# Patient Record
Sex: FEMALE | Race: WHITE
[De-identification: names, ages, dates, MRNs, and addresses within clinical notes are randomized per-mention and may not be internally consistent; named-entity substitution may affect disease eponyms.]

---

## 2020-09-22 ENCOUNTER — HOSPITAL ENCOUNTER (INPATIENT)
Dept: HOSPITAL 41 - JD.OB | Age: 25
LOS: 3 days | Discharge: HOME | DRG: 540 | End: 2020-09-25
Attending: OBSTETRICS & GYNECOLOGY | Admitting: OBSTETRICS & GYNECOLOGY
Payer: COMMERCIAL

## 2020-09-22 DIAGNOSIS — Z3A.40: ICD-10-CM

## 2020-09-22 DIAGNOSIS — Z20.828: ICD-10-CM

## 2020-09-22 PROCEDURE — U0002 COVID-19 LAB TEST NON-CDC: HCPCS

## 2020-09-22 RX ADMIN — Medication SCH MLS/HR: at 21:50

## 2020-09-22 NOTE — PCM.LDHP
<Beena Martin - Last Filed: 20 08:27>





L&D History of Present Illness





- General


Admit Problem/Dx: 


                   Patient Status Order with Admit Dx/Problem





20 17:49


Patient Status [ADT] Routine 








                           Admission Diagnosis/Problem











Admission Diagnosis/Problem    Pregnancy

















- Related Data


Allergies/Adverse Reactions: 


                                    Allergies











Allergy/AdvReac Type Severity Reaction Status Date / Time


 


No Known Allergies Allergy   Verified 20 18:42











Home Medications: 


                                    Home Meds





Calcium Carbonate [Tums] 1,000 mg PO BID 20 [History]


Prenatal Vit/FA/Fe Fumarate/Se [Prenatal MTR] 1 tab PO DAILY 20 [History]











H&P Review of Systems





- Review of Systems:


General: Reports: No Symptoms


HEENT: Reports: No Symptoms


Pulmonary: Reports: No Symptoms


Cardiovascular: Reports: No Symptoms


Gastrointestinal: Reports: No Symptoms


Genitourinary: Reports: No Symptoms


Musculoskeletal: Reports: No Symptoms


Skin: Reports: No Symptoms


Psychiatric: Reports: No Symptoms


Neurological: Reports: No Symptoms


Hematologic/Lymphatic: Reports: No Symptoms


Immunologic: Reports: No Symptoms





L&D Exam





- Vital Signs


Vital Signs: 


                                Last Vital Signs











Temp  37.2 C   20 17:49


 


Pulse  86   20 17:49


 


Resp  14   20 17:49


 


BP  131/85   20 17:49


 


Pulse Ox      














- Patient Data


Lab Results Last 24 hrs: 


                         Laboratory Results - last 24 hr











  20 Range/Units





  17:45 18:02 18:02 


 


WBC    9.33  (3.98-10.04)  K/mm3


 


RBC    4.44  (3.98-5.22)  M/mm3


 


Hgb    12.8  (11.2-15.7)  gm/dl


 


Hct    38.4  (34.1-44.9)  %


 


MCV    86.5  (79.4-94.8)  fl


 


MCH    28.8  (25.6-32.2)  pg


 


MCHC    33.3  (32.2-35.5)  g/dl


 


RDW Std Deviation    40.7  (36.4-46.3)  fL


 


Plt Count    240  (182-369)  K/mm3


 


MPV    10.1  (9.4-12.3)  fl


 


Neut % (Auto)    66.0  (34.0-71.1)  %


 


Lymph % (Auto)    22.2  (19.3-51.7)  %


 


Mono % (Auto)    10.4  (4.7-12.5)  %


 


Eos % (Auto)    1.0  (0.7-5.8)  


 


Baso % (Auto)    0.1  (0.1-1.2)  %


 


Neut # (Auto)    6.16 H  (1.56-6.13)  K/mm3


 


Lymph # (Auto)    2.07  (1.18-3.74)  K/mm3


 


Mono # (Auto)    0.97 H  (0.24-0.36)  K/mm3


 


Eos # (Auto)    0.09  (0.04-0.36)  K/mm3


 


Baso # (Auto)    0.01  (0.01-0.08)  K/mm3


 


RPR   Non-reactive   (NONREACTIVE)  


 


SARS CoV-2 RNA Rapid JUANA  Negative    (NEGATIVE)  











Result Diagrams: 


                                 20 18:02





Orders Last 24hrs: 


                               Active Orders 24 hr











 Category Date Time Status


 


 Patient Status [ADT] Routine ADT  20 17:49 Active


 


 Activity as Tolerated [RC] PFP Care  20 17:49 Active


 


 Communication Order [RC] ASDIRECTED Care  20 17:49 Active


 


 Fetal Heart Tones [RC] ASDIRECTED Care  20 17:50 Active


 


 Notify Provider [RC] ASDIRECTED Care  20 01:08 Active


 


 Notify Provider [RC] PFP Care  20 17:49 Active


 


 Notify Provider [RC] PRN Care  20 17:49 Active


 


 Peripheral IV Care [RC] .AS DIRECTED Care  20 17:50 Active


 


 Urinary Catheter Assessment [RC] ASDIRECTED Care  20 17:49 Active


 


 Vital Signs [RC] PER UNIT ROUTINE Care  20 17:49 Active


 


 Acetaminophen [TylenoL] Med  20 17:49 Active





 650 mg PO Q4H PRN   


 


 Bupivacaine/fentaNYL/NS [fentaNYL/Bupivacaine/NS 2 MCG- Med  20 01:08 

Active





 0.125% 100 ML]   





 100 ml EPIDUR ASDIRECTED PRN   


 


 Calcium Carbonate [Tums] Med  20 17:49 Active





 1,000 mg PO Q2H PRN   


 


 Lactated Ringers [Ringers, Lactated] 1,000 ml Med  20 18:00 Active





 IV ASDIRECTED   


 


 Nalbuphine [Nubain] Med  20 17:49 Active





 10 mg IVPUSH Q2H PRN   


 


 Ondansetron [Zofran] Med  20 17:49 Active





 4 mg IVPUSH Q4H PRN   


 


 Oxytocin/Lactated Ringers [Pitocin in LR 10 Units/1,000 Med  20 18:00 

Active





 ML]   





 10 unit in 1,000 ml IV .CONTINUOUS   


 


 Oxytocin/Lactated Ringers [Pitocin in LR 10 Units/1,000 Med  20 18:00 

Active





 ML]   





 10 unit in 1,000 ml IV TITRATE   


 


 Sodium Chloride 0.9% [Saline Flush] Med  20 17:49 Active





 10 ml FLUSH ASDIRECTED PRN   


 


 diphenhydrAMINE [Benadryl] Med  20 01:08 Active





 25 mg IVPUSH Q6H PRN   


 


 ePHEDrine [ePHEDrine sulfate] Med  20 01:08 Active





 5 mg IVPUSH ASDIRECTED PRN   


 


 fentaNYL [Sublimaze] Med  20 01:08 Active





 100 mcg EPIDUR Q3H PRN   


 


 Electronic Fetal Heart Tones Ext w TOCO [WOMSER] Oth  20 17:49 Ordered





 Routine   


 


 Electronic Fetal Heart Tones Internal [WOMSER] Per Unit Ot  20 17:49 

Ordered





 Routine   


 


 Peripheral IV Insertion Adult [OM.PC] Routine Oth  20 17:49 Ordered


 


 Resuscitation Status Routine Resus Stat  20 17:49 Ordered








                                Medication Orders





Acetaminophen (Tylenol)  650 mg PO Q4H PRN


   PRN Reason: Pain (Mild 1-3) and fever


Calcium Carbonate/Glycine (Tums)  1,000 mg PO Q2H PRN


   PRN Reason: Indigestion


Diphenhydramine HCl (Benadryl)  25 mg IVPUSH Q6H PRN


   PRN Reason: pruritis


Ephedrine Sulfate (Ephedrine Sulfate)  5 mg IVPUSH ASDIRECTED PRN


   PRN Reason: Hypotension


Fentanyl (Sublimaze)  100 mcg EPIDUR Q3H PRN


   PRN Reason: Pain


   Last Admin: 20 01:44  Dose: 100 mcg


   Documented by: SIERRA


Fentanyl/Bupivacaine HCl (Fentanyl/Bupivacaine/Ns 2 Mcg-0.125% 100 Ml)  100 ml 

EPIDUR ASDIRECTED PRN


   PRN Reason: Pain


   Last Admin: 20 01:44  Dose: 100 ml


   Documented by: SIERRA


Oxytocin/Lactated Ringer's (Pitocin In Lr 10 Units/1,000 Ml)  10 unit in 1,000 

mls @ 12 mls/hr IV TITRATE AUDIE; Protocol


   Last Titration: 20 04:35  Dose: 14 munits/min, 84 mls/hr


   Documented by: SIERRA


   Titration: 20 03:56  Dose: 12 munits/min, 72 mls/hr


   Documented by: SIERRA


   Titration: 20 03:10  Dose: 10 munits/min, 60 mls/hr


   Documented by: SIERRA


   Titration: 20 01:55  Dose: 8 munits/min, 48 mls/hr


   Documented by: SIERRA


   Titration: 20 23:21  Dose: 6 munits/min, 36 mls/hr


   Documented by: SIERRA


   Titration: 20 22:42  Dose: 4 munits/min, 24 mls/hr


   Documented by: SIERRA


   Admin: 20 21:50  Dose: 2 munits/min, 12 mls/hr


   Documented by: SIERRA


Oxytocin/Lactated Ringer's (Pitocin In Lr 10 Units/1,000 Ml)  10 unit in 1,000 

mls @ 100 mls/hr IV .CONTINUOUS AUDIE; Protocol


Lactated Ringer's (Ringers, Lactated)  1,000 mls @ 100 mls/hr IV ASDIRECTED AUDIE


   Last Admin: 20 03:07  Dose: 100 mls/hr


   Documented by: SIERRA


   Infusion: 20 03:07  Dose: 100 mls/hr


   Documented by: SIERRA


   Admin: 20 01:07  Dose: 100 mls/hr


   Documented by: SIERRA


   Infusion: 20 01:07  Dose: 100 mls/hr


   Documented by: SIERRA


   Admin: 20 21:50  Dose: 100 mls/hr


   Documented by: SIERRA


Nalbuphine HCl (Nubain)  10 mg IVPUSH Q2H PRN


   PRN Reason: Pain


Ondansetron HCl (Zofran)  4 mg IVPUSH Q4H PRN


   PRN Reason: Nausea/Vomiting


Sodium Chloride (Saline Flush)  10 ml FLUSH ASDIRECTED PRN


   PRN Reason: Keep Vein Open











<IvonneTanisha L - Last Filed: 20 13:45>





L&D History of Present Illness





- General


Date of Service: 20


Admit Problem/Dx: 


                   Patient Status Order with Admit Dx/Problem





20 17:49


Patient Status [ADT] Routine 








                           Admission Diagnosis/Problem











Admission Diagnosis/Problem    Pregnancy














Source of Information: Patient


History Limitations: Reports: No Limitations





- History of Present Illness


Introduction:: 


 at 40w6d comes in for induction of labor. She lost her mucus plug this 

morning, but she has not had any regular contractions at this time.


Location, Pregnancy: Reports: Uterus





Past Medical History


Gastrointestinal History: Reports: GERD


OB/GYN History: Reports: Pregnancy, Spontaneous 





Social & Family History





- Family History


Family Medical History: Noncontributory





- Tobacco Use


Smoking Status *Q: Never Smoker


Second Hand Smoke Exposure: No





- Caffeine Use


Caffeine Use: Reports: None





- Recreational Drug Use


Recreational Drug Use: No





H&P Review of Systems





- Review of Systems:


Review Of Systems: See Below


General: Denies: Fever, Chills


HEENT: Reports: No Symptoms


Pulmonary: Reports: No Symptoms


Cardiovascular: Reports: No Symptoms


Gastrointestinal: Reports: No Symptoms


Genitourinary: Reports: No Symptoms


Musculoskeletal: Reports: No Symptoms


Skin: Reports: No Symptoms


Psychiatric: Reports: No Symptoms


Neurological: Reports: No Symptoms


Hematologic/Lymphatic: Reports: No Symptoms


Immunologic: Reports: No Symptoms





L&D Exam





- Exam


Exam: See Below





- Vital Signs


Vital Signs: 


                                Last Vital Signs











Temp  99.0 F   20 17:49


 


Pulse  86   20 17:49


 


Resp  14   20 17:49


 


BP  131/85   20 17:49


 


Pulse Ox      











Weight: 184 lb 14.4 oz





- OB Specific


Fetal Movement: Active


Fetal Heart Tones: Present





- Diaz Score


Diaz Score Consistency: Soft


Diaz Score Effacement: >80%


Diaz Score Dilation: 3-4 cm





- Exam


General: Alert, Oriented


HEENT: Conjunctiva Clear, Mucosa Moist & Pink


Lungs: Clear to Auscultation, Normal Respiratory Effort


Cardiovascular: Regular Rate, Regular Rhythm


Back Exam: Normal Inspection


Extremities: Normal Inspection


Skin: Warm, Dry


Psychiatric: Alert, Normal Affect





- Patient Data


Lab Results Last 24 hrs: 


                         Laboratory Results - last 24 hr











  20 Range/Units





  17:45 18:02 


 


WBC   9.33  (3.98-10.04)  K/mm3


 


RBC   4.44  (3.98-5.22)  M/mm3


 


Hgb   12.8  (11.2-15.7)  gm/dl


 


Hct   38.4  (34.1-44.9)  %


 


MCV   86.5  (79.4-94.8)  fl


 


MCH   28.8  (25.6-32.2)  pg


 


MCHC   33.3  (32.2-35.5)  g/dl


 


RDW Std Deviation   40.7  (36.4-46.3)  fL


 


Plt Count   240  (182-369)  K/mm3


 


MPV   10.1  (9.4-12.3)  fl


 


Neut % (Auto)   66.0  (34.0-71.1)  %


 


Lymph % (Auto)   22.2  (19.3-51.7)  %


 


Mono % (Auto)   10.4  (4.7-12.5)  %


 


Eos % (Auto)   1.0  (0.7-5.8)  


 


Baso % (Auto)   0.1  (0.1-1.2)  %


 


Neut # (Auto)   6.16 H  (1.56-6.13)  K/mm3


 


Lymph # (Auto)   2.07  (1.18-3.74)  K/mm3


 


Mono # (Auto)   0.97 H  (0.24-0.36)  K/mm3


 


Eos # (Auto)   0.09  (0.04-0.36)  K/mm3


 


Baso # (Auto)   0.01  (0.01-0.08)  K/mm3


 


SARS CoV-2 RNA Rapid JUANA  Negative   (NEGATIVE)  











Result Diagrams: 


                                 20 18:02








- Problem List


(1) Pregnancy


SNOMED Code(s): 46000343


   ICD Code: Z34.90 - ENCNTR FOR SUPRVSN OF NORMAL PREGNANCY, UNSP, UNSP TRIME

STER   Status: Acute   Current Visit: Yes   


Qualifiers: 


   Weeks of gestation: 40 weeks   Qualified Code(s): Z3A.40 - 40 weeks gestation

 of pregnancy   


Problem List Initiated/Reviewed/Updated: Yes


Orders Last 24hrs: 


                               Active Orders 24 hr











 Category Date Time Status


 


 Patient Status [ADT] Routine ADT  20 17:49 Active


 


 Activity as Tolerated [RC] PFP Care  20 17:49 Active


 


 Communication Order [RC] ASDIRECTED Care  20 17:49 Active


 


 Fetal Heart Tones [RC] ASDIRECTED Care  20 17:50 Active


 


 Notify Provider [RC] PFP Care  20 17:49 Active


 


 Notify Provider [RC] PRN Care  20 17:49 Active


 


 Peripheral IV Care [RC] .AS DIRECTED Care  20 17:50 Active


 


 Urinary Catheter Assessment [RC] ASDIRECTED Care  20 17:49 Active


 


 Vital Signs [RC] PER UNIT ROUTINE Care  20 17:49 Active


 


 RAPID PLASMA REAGIN,RPR [CHEM] Routine Lab  20 18:02 Received


 


 Acetaminophen [TylenoL] Med  20 17:49 Active





 650 mg PO Q4H PRN   


 


 Calcium Carbonate [Tums] Med  20 17:49 Active





 1,000 mg PO Q2H PRN   


 


 Lactated Ringers [Ringers, Lactated] 1,000 ml Med  20 18:00 Active





 IV ASDIRECTED   


 


 Nalbuphine [Nubain] Med  20 17:49 Active





 10 mg IVPUSH Q2H PRN   


 


 Ondansetron [Zofran] Med  20 17:49 Active





 4 mg IVPUSH Q4H PRN   


 


 Oxytocin/Lactated Ringers [Pitocin in LR 10 Units/1,000 Med  20 18:00 

Active





 ML]   





 10 unit in 1,000 ml IV .CONTINUOUS   


 


 Oxytocin/Lactated Ringers [Pitocin in LR 10 Units/1,000 Med  20 18:00 

Active





 ML]   





 10 unit in 1,000 ml IV TITRATE   


 


 Sodium Chloride 0.9% [Saline Flush] Med  20 17:49 Active





 10 ml FLUSH ASDIRECTED PRN   


 


 Electronic Fetal Heart Tones Ext w TOCO [WOMSER] Oth  20 17:49 Ordered





 Routine   


 


 Electronic Fetal Heart Tones Internal [WOMSER] Per Unit Oth  20 17:49 

Ordered





 Routine   


 


 Peripheral IV Insertion Adult [OM.PC] Routine Oth  20 17:49 Ordered


 


 Resuscitation Status Routine Resus Stat  20 17:49 Ordered








                                Medication Orders





Acetaminophen (Tylenol)  650 mg PO Q4H PRN


   PRN Reason: Pain (Mild 1-3) and fever


Calcium Carbonate/Glycine (Tums)  1,000 mg PO Q2H PRN


   PRN Reason: Indigestion


Oxytocin/Lactated Ringer's (Pitocin In Lr 10 Units/1,000 Ml)  10 unit in 1,000 

mls @ 12 mls/hr IV TITRATE AUDIE; Protocol


Oxytocin/Lactated Ringer's (Pitocin In Lr 10 Units/1,000 Ml)  10 unit in 1,000 

mls @ 100 mls/hr IV .CONTINUOUS AUDIE; Protocol


Lactated Ringer's (Ringers, Lactated)  1,000 mls @ 100 mls/hr IV ASDIRECTED AUDIE


Nalbuphine HCl (Nubain)  10 mg IVPUSH Q2H PRN


   PRN Reason: Pain


Ondansetron HCl (Zofran)  4 mg IVPUSH Q4H PRN


   PRN Reason: Nausea/Vomiting


Sodium Chloride (Saline Flush)  10 ml FLUSH ASDIRECTED PRN


   PRN Reason: Keep Vein Open








Assessment/Plan Comment:: 


Will continue to monitor her for signs of labor and assess fetal wellbeing. Will

 begin oxytocin and give an epidural as needed.

## 2020-09-23 PROCEDURE — 3E0R3BZ INTRODUCTION OF ANESTHETIC AGENT INTO SPINAL CANAL, PERCUTANEOUS APPROACH: ICD-10-PCS | Performed by: OBSTETRICS & GYNECOLOGY

## 2020-09-23 PROCEDURE — 3E033VJ INTRODUCTION OF OTHER HORMONE INTO PERIPHERAL VEIN, PERCUTANEOUS APPROACH: ICD-10-PCS | Performed by: OBSTETRICS & GYNECOLOGY

## 2020-09-23 PROCEDURE — 00HU33Z INSERTION OF INFUSION DEVICE INTO SPINAL CANAL, PERCUTANEOUS APPROACH: ICD-10-PCS | Performed by: OBSTETRICS & GYNECOLOGY

## 2020-09-23 PROCEDURE — 10907ZC DRAINAGE OF AMNIOTIC FLUID, THERAPEUTIC FROM PRODUCTS OF CONCEPTION, VIA NATURAL OR ARTIFICIAL OPENING: ICD-10-PCS | Performed by: OBSTETRICS & GYNECOLOGY

## 2020-09-23 RX ADMIN — FENTANYL CITRATE PRN MCG: 50 INJECTION, SOLUTION INTRAMUSCULAR; INTRAVENOUS at 09:47

## 2020-09-23 RX ADMIN — FENTANYL CITRATE PRN MCG: 50 INJECTION, SOLUTION INTRAMUSCULAR; INTRAVENOUS at 09:24

## 2020-09-23 RX ADMIN — FENTANYL CITRATE PRN MCG: 50 INJECTION, SOLUTION INTRAMUSCULAR; INTRAVENOUS at 01:44

## 2020-09-23 RX ADMIN — Medication SCH MLS/HR: at 12:11

## 2020-09-23 RX ADMIN — Medication PRN ML: at 01:44

## 2020-09-23 RX ADMIN — Medication PRN ML: at 08:58

## 2020-09-23 RX ADMIN — KETOROLAC TROMETHAMINE SCH MG: 30 INJECTION, SOLUTION INTRAMUSCULAR at 23:24

## 2020-09-23 NOTE — PCM.OPNOTE
- General Post-Op/Procedure Note


Date of Surgery/Procedure: 20


Operative Procedure(s): primary  section


Findings: 





Viable male, weight 6#6oz, normal uterus tubes and ovaries.


Pre Op Diagnosis: failure to descend


Primary Surgeon: Beena Martin


Assistant: Kali Islas Jr


Pathology: 





none





Fluid Replacement, Intraop: 1,100


Output, Urine Amount: 300


EBL in mLs: 700


Complications: None


Condition: Good


Free Text/Narrative:: 


The patient was taken to the operating room where spinal was placed anesthesia 

was dosed to surgical levels without difficulty. The patient was prepped and 

draped in the usual sterile fashion in the dorsal supine position with a 

leftward tilt. A Pfannenstiel skin incision was made with the scalpel and 

carried through to the underlying layer of fascia. The fascia was incised in the

midline and extended laterally using Grullon scissors. Kocher clamps were used to 

elevate the superior aspect of the fascial incision, which was elevated, and the

underlying rectus muscles were dissected off bluntly and using Grullon scissors. 

Attention was then turned to the inferior aspect of the fascial incision, which 

in similar fashion was grasped with Kocher clamps, elevated, and the underlying 

rectus muscles were dissected off bluntly and using the grullon. The rectus muscles

were dissected in the midline.





The peritoneum was entered bluntly; this incision was extended superiorly and 

inferiorly with good visualization of the bladder. The bladder blade was 

inserted. The vesicouterine peritoneum was identified and entered sharply using 

Metzenbaum scissors. This incision was extended laterally and the bladder flap 

was created digitally. The bladder blade was reinserted. The lower uterine 

segment was incised in a transverse fashion using the scalpel and with digital 

traction.





Clear fluid was noted. The infant was subsequently delivered by flexing the head

to the incision. Body and shoulders followed without difficulty.  The cord was 

clamped and cut. The infant was subsequently handed to the awaiting pediatrician

whose presence had been requested.. The placenta was delivered spontaneously 

intact with a three-vessel cord noted. The uterus was exteriorized and cleared 

of all clots and debris. The uterine incision was repaired in 2 layers using 0 

monocryl. Hemostasis was visualized.  Hemostasis was visualized bilaterally. The

uterus was returned to the abdomen. The uterine incision was reexamined and it 

was noted to be hemostatic. The pelvis was copiously irrigated. The fascia was 

closed with 1 PDS suture,  and the skin was closed with 3-0 monocryl. Sponge, 

lap, and instrument counts were correct x2. The patient was stable at the 

completion of the procedure and was subsequently transferred to the recovery 

room in stable condition.

## 2020-09-23 NOTE — PCM.POSTAN
POST ANESTHESIA ASSESSMENT





- MENTAL STATUS


Mental Status: Alert





- VITAL SIGNS


Vital Signs: 


                                Last Vital Signs











Temp  37.1 C   09/23/20 17:11


 


Pulse  86   09/22/20 17:49


 


Resp  14   09/23/20 17:20


 


BP  117/50 L  09/23/20 17:20


 


Pulse Ox  99   09/23/20 17:20














- RESPIRATORY


Respiratory Status: Respiratory Rate WNL, Airway Patent, O2 Saturation Stable





- CARDIOVASCULAR


CV Status: Pulse Rate WNL, Blood Pressure Stable





- GASTROINTESTINAL


GI Status: No Symptoms





- POST OP HYDRATION


Hydration Status: Adequate & Stable

## 2020-09-23 NOTE — PCM.PNLD
Labor Progress Note





- VS & Meds


Vital Signs: 


                                Last Vital Signs











Temp  37.2 C   20 17:49


 


Pulse  86   20 17:49


 


Resp  14   20 17:49


 


BP  131/85   20 17:49


 


Pulse Ox      











Active Medications: 


                               Current Medications





Acetaminophen (Tylenol)  650 mg PO Q4H PRN


   PRN Reason: Pain (Mild 1-3) and fever


   Last Admin: 20 12:14 Dose:  650 mg


   Documented by: 


Calcium Carbonate/Glycine (Tums)  1,000 mg PO Q2H PRN


   PRN Reason: Indigestion


Diphenhydramine HCl (Benadryl)  25 mg IVPUSH Q6H PRN


   PRN Reason: pruritis


Ephedrine Sulfate (Ephedrine Sulfate)  5 mg IVPUSH ASDIRECTED PRN


   PRN Reason: Hypotension


Fentanyl (Sublimaze)  100 mcg EPIDUR Q3H PRN


   PRN Reason: Pain


   Last Admin: 20 09:47 Dose:  100 mcg


   Documented by: 


Fentanyl/Bupivacaine HCl (Fentanyl/Bupivacaine/Ns 2 Mcg-0.125% 100 Ml)  100 ml 

EPIDUR ASDIRECTED PRN


   PRN Reason: Pain


   Last Admin: 20 08:58 Dose:  100 ml


   Documented by: 


Oxytocin/Lactated Ringer's (Pitocin In Lr 10 Units/1,000 Ml)  10 unit in 1,000 

mls @ 12 mls/hr IV TITRATE AUDIE; Protocol


   Last Titration: 20 13:00 Dose:  18 munits/min, 108 mls/hr


   Documented by: 


Oxytocin/Lactated Ringer's (Pitocin In Lr 10 Units/1,000 Ml)  10 unit in 1,000 

mls @ 100 mls/hr IV .CONTINUOUS AUDIE; Protocol


Lactated Ringer's (Ringers, Lactated)  1,000 mls @ 100 mls/hr IV ASDIRECTED AUDIE


   Last Admin: 20 12:14 Dose:  100 mls/hr


   Documented by: 


Nalbuphine HCl (Nubain)  10 mg IVPUSH Q2H PRN


   PRN Reason: Pain


Ondansetron HCl (Zofran)  4 mg IVPUSH Q4H PRN


   PRN Reason: Nausea/Vomiting


Sodium Chloride (Saline Flush)  10 ml FLUSH ASDIRECTED PRN


   PRN Reason: Keep Vein Open





Discontinued Medications





Fentanyl (Sublimaze) Confirm Administered Dose 100 mcg .ROUTE .STK-MED ONE


   Stop: 20 01:11


   Last Admin: 20 02:23 Dose:  Not Given


   Documented by: 


Lidocaine HCl (Xylocaine 1%)  50 ml INJECT ONETIME ONE


   Stop: 20 17:50











- Uterine Contractions


Contraction Intensity: Mild





- Fetal Monitoring


Fetal Strip Review: Category I





- Vaginal Exam


Dilation (cm): 9.5


Effacement (Percent): 80


Station: -2





- Labor Progress (Free Text)


Labor Progress: 





Still has anterior rim since 11am. Will attempt further position changes and 

recheck in 30 minutes or so. If no progress will consent for  section.

## 2020-09-23 NOTE — PCM.PREANE
Preanesthetic Assessment





- Procedure


Proposed Procedure: 





alok





- Anesthesia/Transfusion/Family Hx


Anesthesia History: No Prior Anesthesia


Family History of Anesthesia Reaction: No


Transfusion History: No Prior Transfusion(s)





- Review of Systems


General: No Symptoms


Pulmonary: No Symptoms


Cardiovascular: No Symptoms


Gastrointestinal: No Symptoms


Neurological: No Symptoms


Other: Reports: None





- Physical Assessment


Vital Signs: 





                                Last Vital Signs











Temp  99.0 F   20 17:49


 


Pulse  86   20 17:49


 


Resp  14   20 17:49


 


BP  131/85   20 17:49


 


Pulse Ox      











Height: 5 ft


Weight: 83.869 kg


ASA Class: 2


Mental Status: Alert & Oriented x3


Airway Class: Mallampati = 1


Dentition: Reports: Normal Dentition


Thyro-Mental Finger Breadths: 3


Mouth Opening Finger Breadths: 3


ROM/Head Extension: Full


Lungs: Clear to Auscultation, Normal Respiratory Effort


Cardiovascular: Regular Rate, Regular Rhythm, No Murmurs





- Lab


Values: 





                             Laboratory Last Values











WBC  9.33 K/mm3 (3.98-10.04)   20  18:02    


 


RBC  4.44 M/mm3 (3.98-5.22)   20  18:02    


 


Hgb  12.8 gm/dl (11.2-15.7)   20  18:02    


 


Hct  38.4 % (34.1-44.9)   20  18:02    


 


MCV  86.5 fl (79.4-94.8)   20  18:02    


 


MCH  28.8 pg (25.6-32.2)   20  18:    


 


MCHC  33.3 g/dl (32.2-35.5)   20  18:02    


 


RDW Std Deviation  40.7 fL (36.4-46.3)   20  18:02    


 


Plt Count  240 K/mm3 (182-369)   20  18:02    


 


MPV  10.1 fl (9.4-12.3)   20  18:02    


 


Neut % (Auto)  66.0 % (34.0-71.1)   20  18:02    


 


Lymph % (Auto)  22.2 % (19.3-51.7)   20  18:02    


 


Mono % (Auto)  10.4 % (4.7-12.5)   20  18:02    


 


Eos % (Auto)  1.0  (0.7-5.8)   20  18:02    


 


Baso % (Auto)  0.1 % (0.1-1.2)   20  18:02    


 


Neut # (Auto)  6.16 K/mm3 (1.56-6.13)  H  20  18:02    


 


Lymph # (Auto)  2.07 K/mm3 (1.18-3.74)   20  18:02    


 


Mono # (Auto)  0.97 K/mm3 (0.24-0.36)  H  20  18:02    


 


Eos # (Auto)  0.09 K/mm3 (0.04-0.36)   20  18:02    


 


Baso # (Auto)  0.01 K/mm3 (0.01-0.08)   20  18:02    


 


RPR  Non-reactive  (NONREACTIVE)   20  18:02    


 


SARS CoV-2 RNA Rapid JUANA  Negative  (NEGATIVE)   20  17:45    














- Allergies


Allergies/Adverse Reactions: 


                                    Allergies











Allergy/AdvReac Type Severity Reaction Status Date / Time


 


No Known Allergies Allergy   Verified 20 18:42














- Blood


Blood Available: No





- Acknowledgements


Anesthesia Type Planned: Epidural


Pt an Appropriate Candidate for the Planned Anesthesia: Yes


Alternatives and Risks of Anesthesia Discussed w Pt/Guardian: Yes


Pt/Guardian Understands and Agrees with Anesthesia Plan: Yes





PreAnesthesia Questionnaire


Cardiovascular History: Reports: None


Respiratory History: Reports: None


Gastrointestinal History: Reports: GERD


OB/GYN History: Reports: Pregnancy, Spontaneous 


: 2


Para: 0





- SUBSTANCE USE


Smoking Status *Q: Never Smoker


Tobacco Use Within Last Twelve Months: No


Second Hand Smoke Exposure: No


Days Per Week of Alcohol Use: 0


Recreational Drug Use History: No





- HOME MEDS


Home Medications: 


                                    Home Meds





Calcium Carbonate [Tums] 1,000 mg PO BID 20 [History]


Prenatal Vit/FA/Fe Fumarate/Se [Prenatal MTR] 1 tab PO DAILY 20 [History]











- CURRENT (IN HOUSE) MEDS


Current Meds: 





                               Current Medications





Acetaminophen (Tylenol)  650 mg PO Q4H PRN


   PRN Reason: Pain (Mild 1-3) and fever


Calcium Carbonate/Glycine (Tums)  1,000 mg PO Q2H PRN


   PRN Reason: Indigestion


Diphenhydramine HCl (Benadryl)  25 mg IVPUSH Q6H PRN


   PRN Reason: pruritis


Ephedrine Sulfate (Ephedrine Sulfate)  5 mg IVPUSH ASDIRECTED PRN


   PRN Reason: Hypotension


Fentanyl (Sublimaze)  100 mcg EPIDUR Q3H PRN


   PRN Reason: Pain


Fentanyl/Bupivacaine HCl (Fentanyl/Bupivacaine/Ns 2 Mcg-0.125% 100 Ml)  100 ml 

EPIDUR ASDIRECTED PRN


   PRN Reason: Pain


Oxytocin/Lactated Ringer's (Pitocin In Lr 10 Units/1,000 Ml)  10 unit in 1,000 

mls @ 12 mls/hr IV TITRATE AUDIE; Protocol


   Last Titration: 20 23:21 Dose:  6 munits/min, 36 mls/hr


   Documented by: 


Oxytocin/Lactated Ringer's (Pitocin In Lr 10 Units/1,000 Ml)  10 unit in 1,000 

mls @ 100 mls/hr IV .CONTINUOUS AUDIE; Protocol


Lactated Ringer's (Ringers, Lactated)  1,000 mls @ 100 mls/hr IV ASDIRECTED AUDIE


   Last Admin: 20 01:07 Dose:  100 mls/hr


   Documented by: 


Nalbuphine HCl (Nubain)  10 mg IVPUSH Q2H PRN


   PRN Reason: Pain


Ondansetron HCl (Zofran)  4 mg IVPUSH Q4H PRN


   PRN Reason: Nausea/Vomiting


Sodium Chloride (Saline Flush)  10 ml FLUSH ASDIRECTED PRN


   PRN Reason: Keep Vein Open





Discontinued Medications





Fentanyl (Sublimaze) Confirm Administered Dose 100 mcg .ROUTE .STK-MED ONE


   Stop: 20 01:11


Lidocaine HCl (Xylocaine 1%)  50 ml INJECT ONETIME ONE


   Stop: 20 17:50

## 2020-09-24 RX ADMIN — KETOROLAC TROMETHAMINE SCH MG: 30 INJECTION, SOLUTION INTRAMUSCULAR at 11:19

## 2020-09-24 RX ADMIN — OXYCODONE HYDROCHLORIDE AND ACETAMINOPHEN PRN TAB: 5; 325 TABLET ORAL at 20:52

## 2020-09-24 RX ADMIN — KETOROLAC TROMETHAMINE SCH MG: 30 INJECTION, SOLUTION INTRAMUSCULAR at 05:43

## 2020-09-24 NOTE — [id number]
Post Anesthesia Note





- EVALUATION WITHIN 48HRS OF ANESTHETIC


Vital Signs in Normal Range: Yes


Patient Participated in Evaluation: Yes


Respiratory Function Stable: Yes


Airway Patent: Yes


Cardiovascular Function Stable: Yes


Hydration Status Stable: Yes


Pain Control Satisfactory: Yes


Nausea and Vomiting Control Satisfactory: Yes


Mental Status Recovered: Yes


Vital Signs: 


                                Last Vital Signs











Temp  36.8 C   09/24/20 04:07


 


Pulse  72   09/24/20 04:07


 


Resp  15   09/24/20 07:00


 


BP  113/54 L  09/24/20 04:07


 


Pulse Ox  98   09/24/20 07:00














- COMMENTS/OBSERVATIONS


Free Text/Narrative:: 





no anesthesia complications noted

## 2020-09-24 NOTE — PCM.PNPP
- General Info


Date of Service: 20


Functional Status: Reports: Pain Controlled





- Review of Systems


General: Reports: No Symptoms


HEENT: Reports: No Symptoms


Pulmonary: Reports: No Symptoms


Cardiovascular: Reports: No Symptoms


Gastrointestinal: Reports: No Symptoms


Genitourinary: Reports: No Symptoms


Musculoskeletal: Reports: No Symptoms


Skin: Reports: No Symptoms


Neurological: Reports: No Symptoms


Psychiatric: Reports: No Symptoms





- General Info


Date of Service: 20





- Patient Data


Vital Signs - Most Recent: 


                                Last Vital Signs











Temp  36.8 C   20 04:07


 


Pulse  72   20 04:07


 


Resp  14   20 04:07


 


BP  113/54 L  20 04:07


 


Pulse Ox  97   20 04:07











Weight - Most Recent: 83.869 kg


I&O - Last 24 Hours: 


                                 Intake & Output











 20





 22:59 06:59 14:59


 


Intake Total 3750  


 


Output Total 2023 900 


 


Balance 1725 -900 











Lab Results - Last 24 Hours: 


                         Laboratory Results - last 24 hr











  20 Range/Units





  14:25 04:49 


 


WBC   12.16 H  (3.98-10.04)  K/mm3


 


RBC   3.27 L  (3.98-5.22)  M/mm3


 


Hgb   9.4 L D  (11.2-15.7)  gm/dl


 


Hct   29.1 L  (34.1-44.9)  %


 


MCV   89.0  (79.4-94.8)  fl


 


MCH   28.7  (25.6-32.2)  pg


 


MCHC   32.3  (32.2-35.5)  g/dl


 


RDW Std Deviation   40.9  (36.4-46.3)  fL


 


Plt Count   216  (182-369)  K/mm3


 


MPV   10.4  (9.4-12.3)  fl


 


Neut % (Auto)   71.9 H  (34.0-71.1)  %


 


Lymph % (Auto)   17.7 L  (19.3-51.7)  %


 


Mono % (Auto)   9.4  (4.7-12.5)  %


 


Eos % (Auto)   0.6 L  (0.7-5.8)  


 


Baso % (Auto)   0.1  (0.1-1.2)  %


 


Neut # (Auto)   8.75 H  (1.56-6.13)  K/mm3


 


Lymph # (Auto)   2.15  (1.18-3.74)  K/mm3


 


Mono # (Auto)   1.14 H  (0.24-0.36)  K/mm3


 


Eos # (Auto)   0.07  (0.04-0.36)  K/mm3


 


Baso # (Auto)   0.01  (0.01-0.08)  K/mm3


 


Blood Type  B POSITIVE   


 


Gel Antibody Screen  Negative   











Med Orders - Current: 


                               Current Medications





Diphenhydramine HCl (Benadryl)  25 mg IVPUSH Q6H PRN


   PRN Reason: Itching or Nausea


Docusate Sodium (Colace)  100 mg PO Q12H PRN


   PRN Reason: Constipation


Ephedrine Sulfate (Ephedrine Sulfate)  5 mg IVPUSH SEECOMMENT PRN


   PRN Reason: Other


Ibuprofen (Motrin)  600 mg PO Q6H PRN


   PRN Reason: mild pain or fever


Ketorolac Tromethamine (Toradol)  30 mg IVPUSH Q6H AUDIE


   Stop: 20 11:01


   Last Admin: 20 05:43 Dose:  30 mg


   Documented by: 


Naloxone HCl (Narcan)  0.1 mg IVPUSH SEECOMMENT PRN


   PRN Reason: Respiratory Depression


Oxycodone/Acetaminophen (Percocet 325-5 Mg)  1 tab PO Q4H PRN


   PRN Reason: Pain (severe 7-10)


Oxycodone/Acetaminophen (Percocet 325-5 Mg)  2 tab PO Q4H PRN


   PRN Reason: Pain (severe 7-10)





Discontinued Medications





Acetaminophen (Tylenol)  650 mg PO Q4H PRN


   PRN Reason: Pain (Mild 1-3) and fever


   Last Admin: 20 12:14 Dose:  650 mg


   Documented by: 


Bupivacaine HCl (Marcaine 0.5%) Confirm Administered Dose 30 ml .ROUTE .STK-MED 

ONE


   Stop: 20 15:53


Calcium Carbonate/Glycine (Tums)  1,000 mg PO Q2H PRN


   PRN Reason: Indigestion


Cefazolin Sodium (Ancef) Confirm Administered Dose 2 gm .ROUTE .STK-MED ONE


   Stop: 20 15:59


Citric Acid/Sodium Citrate (Bicitra Solution)  30 ml PO ONETIME ONE


   Stop: 20 15:28


   Last Admin: 20 15:37 Dose:  30 ml


   Documented by: 


Diphenhydramine HCl (Benadryl)  25 mg IVPUSH Q6H PRN


   PRN Reason: pruritis


Diphenhydramine HCl (Benadryl)  25 mg IVPUSH Q6H PRN


   PRN Reason: pruritis


   Stop: 20 19:00


Ephedrine Sulfate (Ephedrine Sulfate)  5 mg IVPUSH ASDIRECTED PRN


   PRN Reason: Hypotension


Ephedrine Sulfate (Ephedrine Sulfate)  5 mg IVPUSH ASDIRECTED PRN


   PRN Reason: Hypotension


   Stop: 20 19:00


Fentanyl (Sublimaze)  100 mcg EPIDUR Q3H PRN


   PRN Reason: Pain


   Last Admin: 20 09:47 Dose:  100 mcg


   Documented by: 


Fentanyl (Sublimaze) Confirm Administered Dose 100 mcg .ROUTE .STK-MED ONE


   Stop: 20 01:11


   Last Admin: 20 02:23 Dose:  Not Given


   Documented by: 


Fentanyl (Sublimaze) Confirm Administered Dose 100 mcg .ROUTE .STK-MED ONE


   Stop: 20 15:59


Fentanyl (Sublimaze)  50 mcg IVPUSH Q5M PRN


   PRN Reason: Pain


   Stop: 20 19:00


Fentanyl/Bupivacaine HCl (Fentanyl/Bupivacaine/Ns 2 Mcg-0.125% 100 Ml)  100 ml 

EPIDUR ASDIRECTED PRN


   PRN Reason: Pain


   Last Admin: 20 08:58 Dose:  100 ml


   Documented by: 


Hydromorphone HCl (Dilaudid)  0.5 mg IVPUSH ONETIME PRN


   PRN Reason: Pain


   Stop: 20 19:00


Oxytocin/Lactated Ringer's (Pitocin In Lr 10 Units/1,000 Ml)  10 unit in 1,000 

mls @ 12 mls/hr IV TITRATE AUDIE; Protocol


   Last Titration: 20 13:00 Dose:  18 munits/min, 108 mls/hr


   Documented by: 


Oxytocin/Lactated Ringer's (Pitocin In Lr 10 Units/1,000 Ml)  10 unit in 1,000 

mls @ 100 mls/hr IV .CONTINUOUS AUDIE; Protocol


Lactated Ringer's (Ringers, Lactated)  1,000 mls @ 100 mls/hr IV ASDIRECTED AUDIE


   Last Admin: 20 12:14 Dose:  100 mls/hr


   Documented by: 


Lactated Ringer's (Ringers, Lactated)  1,000 mls @ 125 mls/hr IV ASDIRECTED AUDIE


Azithromycin 500 mg/ Sodium (Chloride)  250 mls @ 250 mls/hr IV ONETIME ONE


   Stop: 20 16:59


   Last Admin: 20 15:52 Dose:  250 mls/hr


   Documented by: 


Lactated Ringer's (Ringers, Lactated) Confirm Administered Dose 2,000 mls @ as 

directed .ROUTE .STK-MED ONE


   Stop: 20 15:59


Dextrose/Lactated Ringer's (Dextrose 5%-Lactated Ringers)  1,000 mls @ 125 

mls/hr IV ASDIRECTED AUDIE


   Stop: 20 02:00


   Last Admin: 20 19:53 Dose:  125 mls/hr


   Documented by: 


Ketorolac Tromethamine (Toradol) Confirm Administered Dose 30 mg .ROUTE .STK-MED

ONE


   Stop: 20 15:59


Lidocaine HCl (Xylocaine 1%)  50 ml INJECT ONETIME ONE


   Stop: 20 17:50


   Last Admin: 20 18:00 Dose:  Not Given


   Documented by: 


Lidocaine/Epinephrine (Xylocaine-Mpf 2%-Epi 1:200,000) Confirm Administered Dose

0 ml .ROUTE .STK-MED ONE


   Stop: 20 15:59


Meperidine HCl (Meperidine) Confirm Administered Dose 50 mg .ROUTE .STK-MED ONE


   Stop: 20 16:54


Metoclopramide HCl (Reglan)  10 mg IVPUSH ONETIME ONE


   Stop: 20 15:28


   Last Admin: 20 15:38 Dose:  10 mg


   Documented by: 


Miscellaneous Medication (Phenylephrine 1 Mg/10 Ml-Ns) Confirm Administered Dose

1 mg .ROUTE .STK-MED ONE


   Stop: 20 15:59


Miscellaneous Medication (Phenylephrine 1 Mg/10 Ml-Ns)  0 mg IVPUSH ONETIME AUDIE


   Stop: 20 19:00


Morphine Sulfate (Duramorph Pf) Confirm Administered Dose 0 mg .ROUTE .STK-MED 

ONE


   Stop: 20 15:59


Morphine Sulfate (Duramorph Pf) Confirm Administered Dose 10 mg .ROUTE .STK-MED 

ONE


   Stop: 20 16:00


Nalbuphine HCl (Nubain)  10 mg IVPUSH Q2H PRN


   PRN Reason: Pain


Ondansetron HCl (Zofran)  4 mg IVPUSH Q4H PRN


   PRN Reason: Nausea/Vomiting


Ondansetron HCl (Zofran) Confirm Administered Dose 4 mg .ROUTE .STK-MED ONE


   Stop: 20 15:59


Ondansetron HCl (Zofran)  4 mg IVPUSH ONETIME PRN


   PRN Reason: Nausea/Vomiting


   Stop: 20 19:00


Oxytocin (Pitocin) Confirm Administered Dose 10 unit .ROUTE .STK-MED ONE


   Stop: 20 15:59


Sodium Chloride (Saline Flush)  10 ml FLUSH ASDIRECTED PRN


   PRN Reason: Keep Vein Open


Sodium Chloride (Saline Flush)  10 ml FLUSH ASDIRECTED PRN


   PRN Reason: Keep Vein Open











- Infant Interaction


Infant Disposition, Postpartum: [place] at Bedside


Support Person: 





- Postpartum Recovery Exam


Fundal Tone: Firm


Fundal Level: At Umbilicus


Fundal Placement: Midline


Lochia Amount: Small


Lochia Color: Rubra/Red


Perineum Description: Intact, Minimal Bruising/Swelling


Bladder Status: Indwelling Catheter in Place


Urinary Elimination: Indwelling Catheter





- Exam


General: Alert, Oriented


HEENT: Pupils Equal


Neck: Supple


Lungs: Clear to Auscultation, Normal Respiratory Effort


Cardiovascular: Regular Rate, Regular Rhythm


GI/Abdominal Exam: Normal Bowel Sounds, Soft, Non-Tender, No Organomegaly, No 

Distention, No Abnormal Bruit, No Mass, Pelvis Stable


Extremities: Normal Inspection, Normal Range of Motion, Non-Tender, No Pedal 

Edema, Normal Capillary Refill


Skin: Warm, Dry, Intact


Neurological: No New Focal Deficit


Psy/Mental Status: Alert, Normal Affect, Normal Mood





- Problem List Review


Problem List Initiated/Reviewed/Updated: Yes





- My Orders


Last 24 Hours: 


My Active Orders





20 Breakfast


Regular Diet [DIET] 





20 18:01


Docusate Sodium [Colace]   100 mg PO Q12H PRN 


Ibuprofen [Motrin]   600 mg PO Q6H PRN 


Naloxone [Narcan]   0.1 mg IVPUSH SEECOMMENT PRN 


diphenhydrAMINE [Benadryl]   25 mg IVPUSH Q6H PRN 


ePHEDrine [ePHEDrine sulfate]   5 mg IVPUSH SEECOMMENT PRN 





20 18:01


Communication Order [RC] PER UNIT ROUTINE 


Vital Signs [RC] Q1HR 


Assess Lochia [WOMSER] Per Unit Routine 


Assess Uterine Involution [WOMSER] Per Unit Routine 


Medication Administration Instruction [OM.PC] Routine 





20 19:38


Acetaminophen/oxyCODONE [Percocet 325-5 MG]   1 tab PO Q4H PRN 





20 23:00


Ketorolac [Toradol]   30 mg IVPUSH Q6H 





20 00:31


Acetaminophen/oxyCODONE [Percocet 325-5 MG]   2 tab PO Q4H PRN 





20 17:16


Urinary Catheter Removal [RC] Per Unit Routine 














- Plan


Plan:: 


Term . Doing well

## 2020-09-25 RX ADMIN — OXYCODONE HYDROCHLORIDE AND ACETAMINOPHEN PRN TAB: 5; 325 TABLET ORAL at 08:14

## 2020-09-25 RX ADMIN — OXYCODONE HYDROCHLORIDE AND ACETAMINOPHEN PRN TAB: 5; 325 TABLET ORAL at 03:33

## 2020-09-25 NOTE — PCM.DCSUM1
**Discharge Summary





- Hospital Course


Free Text/Narrative:: 





Admitted for SROM and labor. Progressed to 9.5 but no further change over 

multiple hours. Decision for .


Diagnosis: Stroke: No





- Discharge Data


Discharge Date: 20


Discharge Disposition: Home, Self-Care 01


Condition: Good





- Referral to Home Health


Primary Care Physician: 


Beena Martin MD








- Patient Summary/Data


Operative Procedure(s) Performed: primary  section





- Patient Instructions


Diet: Usual Diet as Tolerated


Activity: No Strenuous Activities


Activity, Other: pelvic rest


Driving: Do Not Drive


Showering/Bathing: May Shower


Wound/Incision Care: Keep Operative Site/Wound Site Clean and Dry


Notify Provider of: Fever, Increased Pain, Swelling and Redness, Drainage, 

Nausea and/or Vomiting





- Discharge Plan


*PRESCRIPTION DRUG MONITORING PROGRAM REVIEWED*: No


*COPY OF PRESCRIPTION DRUG MONITORING REPORT IN PATIENT ALEJANDRO: No


Home Medications: 


                                    Home Meds





Calcium Carbonate [Tums] 1,000 mg PO BID 20 [History]


Prenatal Vit/FA/Fe Fumarate/Se [Prenatal MTR] 1 tab PO DAILY 20 [History]








Referrals: 


Beena Martin MD [Primary Care Provider] -  (2 weeks)





- Discharge Summary/Plan Comment


DC Time >30 min.: No





- General Info


Date of Service: 20


Functional Status: Reports: Pain Controlled





- Review of Systems


General: Reports: No Symptoms


HEENT: Reports: No Symptoms


Pulmonary: Reports: No Symptoms


Cardiovascular: Reports: No Symptoms


Gastrointestinal: Reports: No Symptoms


Genitourinary: Reports: No Symptoms


Musculoskeletal: Reports: No Symptoms


Skin: Reports: No Symptoms


Neurological: Reports: No Symptoms


Psychiatric: Reports: No Symptoms





- Patient Data


Vitals - Most Recent: 


                                Last Vital Signs











Temp  36.3 C   20 03:37


 


Pulse  77   20 03:37


 


Resp  15   20 03:37


 


BP  104/50 L  20 03:37


 


Pulse Ox  99   20 03:37











Weight - Most Recent: 83.869 kg


I&O - Last 24 hours: 


                                 Intake & Output











 20





 22:59 06:59 14:59


 


Output Total 600  


 


Balance -600  











Med Orders - Current: 


                               Current Medications





Diphenhydramine HCl (Benadryl)  25 mg IVPUSH Q6H PRN


   PRN Reason: Itching or Nausea


Docusate Sodium (Colace)  100 mg PO Q12H PRN


   PRN Reason: Constipation


   Last Admin: 20 21:10 Dose:  100 mg


   Documented by: 


Ephedrine Sulfate (Ephedrine Sulfate)  5 mg IVPUSH SEECOMMENT PRN


   PRN Reason: Other


Ibuprofen (Motrin)  600 mg PO Q6H PRN


   PRN Reason: mild pain or fever


   Last Admin: 20 01:05 Dose:  600 mg


   Documented by: 


Naloxone HCl (Narcan)  0.1 mg IVPUSH SEECOMMENT PRN


   PRN Reason: Respiratory Depression


Oxycodone/Acetaminophen (Percocet 325-5 Mg)  1 tab PO Q4H PRN


   PRN Reason: Pain (severe 7-10)


Oxycodone/Acetaminophen (Percocet 325-5 Mg)  2 tab PO Q4H PRN


   PRN Reason: Pain (severe 7-10)


   Last Admin: 20 03:33 Dose:  2 tab


   Documented by: 





Discontinued Medications





Acetaminophen (Tylenol)  650 mg PO Q4H PRN


   PRN Reason: Pain (Mild 1-3) and fever


   Last Admin: 20 12:14 Dose:  650 mg


   Documented by: 


Bupivacaine HCl (Marcaine 0.5%) Confirm Administered Dose 30 ml .ROUTE .STK-MED 

ONE


   Stop: 20 15:53


   Last Admin: 20 16:37 Dose:  20 ml


   Documented by: 


Calcium Carbonate/Glycine (Tums)  1,000 mg PO Q2H PRN


   PRN Reason: Indigestion


Cefazolin Sodium (Ancef) Confirm Administered Dose 2 gm .ROUTE .STK-MED ONE


   Stop: 20 15:59


Citric Acid/Sodium Citrate (Bicitra Solution)  30 ml PO ONETIME ONE


   Stop: 20 15:28


   Last Admin: 20 15:37 Dose:  30 ml


   Documented by: 


Diphenhydramine HCl (Benadryl)  25 mg IVPUSH Q6H PRN


   PRN Reason: pruritis


Diphenhydramine HCl (Benadryl)  25 mg IVPUSH Q6H PRN


   PRN Reason: pruritis


   Stop: 20 19:00


Ephedrine Sulfate (Ephedrine Sulfate)  5 mg IVPUSH ASDIRECTED PRN


   PRN Reason: Hypotension


Ephedrine Sulfate (Ephedrine Sulfate)  5 mg IVPUSH ASDIRECTED PRN


   PRN Reason: Hypotension


   Stop: 20 19:00


Fentanyl (Sublimaze)  100 mcg EPIDUR Q3H PRN


   PRN Reason: Pain


   Last Admin: 20 09:47 Dose:  100 mcg


   Documented by: 


Fentanyl (Sublimaze) Confirm Administered Dose 100 mcg .ROUTE .STK-MED ONE


   Stop: 20 01:11


   Last Admin: 20 02:23 Dose:  Not Given


   Documented by: 


Fentanyl (Sublimaze) Confirm Administered Dose 100 mcg .ROUTE .STK-MED ONE


   Stop: 20 15:59


Fentanyl (Sublimaze)  50 mcg IVPUSH Q5M PRN


   PRN Reason: Pain


   Stop: 20 19:00


Fentanyl/Bupivacaine HCl (Fentanyl/Bupivacaine/Ns 2 Mcg-0.125% 100 Ml)  100 ml 

EPIDUR ASDIRECTED PRN


   PRN Reason: Pain


   Last Admin: 20 08:58 Dose:  100 ml


   Documented by: 


Hydromorphone HCl (Dilaudid)  0.5 mg IVPUSH ONETIME PRN


   PRN Reason: Pain


   Stop: 20 19:00


Oxytocin/Lactated Ringer's (Pitocin In Lr 10 Units/1,000 Ml)  10 unit in 1,000 

mls @ 12 mls/hr IV TITRATE AUDIE; Protocol


   Last Titration: 20 13:00 Dose:  18 munits/min, 108 mls/hr


   Documented by: 


Oxytocin/Lactated Ringer's (Pitocin In Lr 10 Units/1,000 Ml)  10 unit in 1,000 

mls @ 100 mls/hr IV .CONTINUOUS AUDIE; Protocol


Lactated Ringer's (Ringers, Lactated)  1,000 mls @ 100 mls/hr IV ASDIRECTED AUDIE


   Last Admin: 20 12:14 Dose:  100 mls/hr


   Documented by: 


Lactated Ringer's (Ringers, Lactated)  1,000 mls @ 125 mls/hr IV ASDIRECTED AUDIE


Azithromycin 500 mg/ Sodium (Chloride)  250 mls @ 250 mls/hr IV ONETIME ONE


   Stop: 20 16:59


   Last Admin: 20 15:52 Dose:  250 mls/hr


   Documented by: 


Lactated Ringer's (Ringers, Lactated) Confirm Administered Dose 2,000 mls @ as 

directed .ROUTE .STK-MED ONE


   Stop: 20 15:59


Dextrose/Lactated Ringer's (Dextrose 5%-Lactated Ringers)  1,000 mls @ 125 

mls/hr IV ASDIRECTED AUDIE


   Stop: 20 02:00


   Last Admin: 20 19:53 Dose:  125 mls/hr


   Documented by: 


Ibuprofen (Motrin)  600 mg PO Q6H PRN


   PRN Reason: mild pain or fever


Ketorolac Tromethamine (Toradol) Confirm Administered Dose 30 mg .ROUTE .STK-MED

ONE


   Stop: 20 15:59


Ketorolac Tromethamine (Toradol)  30 mg IVPUSH Q6H [facility]


   Stop: 20 11:01


   Last Admin: 20 11:19 Dose:  30 mg


   Documented by: 


Lidocaine HCl (Xylocaine 1%)  50 ml INJECT ONETIME ONE


   Stop: 20 17:50


   Last Admin: 20 18:00 Dose:  Not Given


   Documented by: 


Lidocaine/Epinephrine (Xylocaine-Mpf 2%-Epi 1:200,000) Confirm Administered Dose

0 ml .ROUTE .STK-MED ONE


   Stop: 20 15:59


Meperidine HCl (Meperidine) Confirm Administered Dose 50 mg .ROUTE .STK-MED ONE


   Stop: 20 16:54


Metoclopramide HCl (Reglan)  10 mg IVPUSH ONETIME ONE


   Stop: 20 15:28


   Last Admin: 20 15:38 Dose:  10 mg


   Documented by: 


Miscellaneous Medication (Phenylephrine 1 Mg/10 Ml-Ns) Confirm Administered Dose

1 mg .ROUTE .STK-MED ONE


   Stop: 20 15:59


Miscellaneous Medication (Phenylephrine 1 Mg/10 Ml-Ns)  0 mg IVPUSH ONETIME AUDIE


   Stop: 20 19:00


Morphine Sulfate (Duramorph Pf) Confirm Administered Dose 0 mg .ROUTE .STK-MED 

ONE


   Stop: 20 15:59


Morphine Sulfate (Duramorph Pf) Confirm Administered Dose 10 mg .ROUTE .STK-MED 

ONE


   Stop: 20 16:00


Nalbuphine HCl (Nubain)  10 mg IVPUSH Q2H PRN


   PRN Reason: Pain


Ondansetron HCl (Zofran)  4 mg IVPUSH Q4H PRN


   PRN Reason: Nausea/Vomiting


Ondansetron HCl (Zofran) Confirm Administered Dose 4 mg .ROUTE .STK-MED ONE


   Stop: 20 15:59


Ondansetron HCl (Zofran)  4 mg IVPUSH ONETIME PRN


   PRN Reason: Nausea/Vomiting


   Stop: 20 19:00


Oxytocin (Pitocin) Confirm Administered Dose 10 unit .ROUTE .STK-MED ONE


   Stop: 20 15:59


Sodium Chloride (Saline Flush)  10 ml FLUSH ASDIRECTED PRN


   PRN Reason: Keep Vein Open


Sodium Chloride (Saline Flush)  10 ml FLUSH ASDIRECTED PRN


   PRN Reason: Keep Vein Open











- Exam


General: Reports: Alert, Oriented


HEENT: Reports: Pupils Equal


Neck: Reports: Supple


Lungs: Reports: Normal Respiratory Effort


Cardiovascular: Reports: Regular Rate, Regular Rhythm


GI/Abdominal Exam: Normal Bowel Sounds, Soft, Non-Tender, Other (incision intact

)


Back Exam: Reports: Normal Inspection, Full Range of Motion


Extremities: Normal Inspection, Normal Range of Motion


Skin: Reports: Warm, Dry, Intact


Wound/Incisions: Reports: Healing Well


Neurological: Reports: No New Focal Deficit


Psy/Mental Status: Reports: Alert, Normal Affect, Normal Mood